# Patient Record
Sex: MALE | Race: WHITE
[De-identification: names, ages, dates, MRNs, and addresses within clinical notes are randomized per-mention and may not be internally consistent; named-entity substitution may affect disease eponyms.]

---

## 2019-08-12 ENCOUNTER — HOSPITAL ENCOUNTER (EMERGENCY)
Dept: HOSPITAL 56 - MW.ED | Age: 24
Discharge: HOME | End: 2019-08-12
Payer: COMMERCIAL

## 2019-08-12 VITALS — DIASTOLIC BLOOD PRESSURE: 95 MMHG | SYSTOLIC BLOOD PRESSURE: 146 MMHG | HEART RATE: 92 BPM

## 2019-08-12 DIAGNOSIS — Z23: ICD-10-CM

## 2019-08-12 DIAGNOSIS — I10: ICD-10-CM

## 2019-08-12 DIAGNOSIS — S16.1XXA: Primary | ICD-10-CM
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
## 2019-08-12 DIAGNOSIS — S00.01XA: ICD-10-CM

## 2019-08-12 PROCEDURE — 72125 CT NECK SPINE W/O DYE: CPT

## 2019-08-12 PROCEDURE — 99283 EMERGENCY DEPT VISIT LOW MDM: CPT

## 2019-08-12 PROCEDURE — 70450 CT HEAD/BRAIN W/O DYE: CPT

## 2019-08-12 PROCEDURE — 90715 TDAP VACCINE 7 YRS/> IM: CPT

## 2019-08-12 PROCEDURE — 90471 IMMUNIZATION ADMIN: CPT

## 2019-08-12 NOTE — CT
INDICATION: Cervical spine injury from trauma, beam fall on head



TECHNIQUE: CT cervical spine without i.v. contrast. Coronal and sagittal 

reformats were obtained.



COMPARISON: None



FINDINGS: 



Alignment: Unremarkable. 



Bone: No acute fractures or aggressive bone lesions are identified. 



Disc: The disc spaces are unremarkable in appearance. The facet joints are 

unremarkable. 



Soft tissue: Moderate to severe nodular soft tissue thickening is noted in 

the posterior nasopharynx is seen. The visualized lung apices and 

mediastinum are unremarkable. Bilateral jugular adenopathy is present with 

lymph nodes measuring up to 1.2 cm. 



IMPRESSIONS: 



1. No acute osseous injuries are identified. 



2. Moderate to severe nodular soft tissue thickening is noted in the 

posterior nasopharynx is seen. In the presence of jugular adenopathy, 

clinical follow-up is recommended to exclude lymphoproliferative disorders.



Dictated by Raúl Flores MD @ 8/12/2019 4:11:00 AM



Please note that all CT scans at this facility use dose modulation, 

iterative reconstruction, and/or weight-based dosing when appropriate to 

reduce radiation dose to as low as reasonably achievable.



Dictated by: Raúl Flores MD @ 08/12/2019 04:11:03



(Electronically Signed)

## 2019-08-12 NOTE — CT
INDICATION: Trauma, beam fall on head



TECHNIQUE: CT Head without i.v. contrast.



COMPARISON: None



FINDINGS: 



CSF space: The ventricles are normal for age. 



Brain: No evidence of mass, acute infarction or hemorrhage is seen. No 

mass-effect or midline shift is seen. The brain parenchyma is otherwise 

normal in appearance with preservation of the gray-white matter junction. 



Calvarium: The visualized paranasal sinuses are well aerated. The mastoid 

air cells are clear. The visualized orbits are grossly unremarkable. The 

calvarium is unremarkable in appearance with no fractures identified. 

Moderate-to-severe soft tissue swelling is seen in the posterior 

nasopharynx. 



IMPRESSIONS: 



1. No evidence of acute infarction, intracranial hemorrhage, or mass-effect 

seen. 



2. Moderate-to-severe soft tissue swelling is seen in the posterior 

nasopharynx. This may be due to adenoidal hypertrophy but correlation with 

physical exam is recommended to exclude any generalized adenopathy 

suggestive of lymphoproliferative disorders or lymphoma.



Dictated by Raúl Flores MD @ 8/12/2019 4:06:46 AM



Please note that all CT scans at this facility use dose modulation, 

iterative reconstruction, and/or weight-based dosing when appropriate to 

reduce radiation dose to as low as reasonably achievable.



Dictated by: Raúl Flores MD @ 08/12/2019 04:06:50



(Electronically Signed)

## 2019-08-12 NOTE — EDM.PDOC
ED HPI GENERAL MEDICAL PROBLEM





- General


Chief Complaint: Laceration


Stated Complaint: CUT ON HEAD


Time Seen by Provider: 08/12/19 03:06





- History of Present Illness


INITIAL COMMENTS - FREE TEXT/NARRATIVE: 





HISTORY AND PHYSICAL:





History of present illness:


The patient is a healthy 23-year-old male who presents with complaints of pain 

to the top of his head and his neck after a piece of wood fell from the balcony 

above his apartment onto the top of his head. Patient showed me a picture of 

his apartment and he was leaning out of a sliding glass door to see where his 

dog was and a loose piece of wood from the balcony above him fell on top of his 

head. He did not pass out or blacked out but he has pain and swelling with a 

superficial wound on the top of his head and is complaining of diffuse neck 

pain. He has no weakness numbness or tingling in his extremities no chest pain 

or shortness of breath and has had no nausea or vomiting. He has no visual 

changes and he is currently under the care of one of our ophthalmologist, Dr. Perdomo, for ongoing issues with his eyes but there is nothing new or different 

this evening. He does not feel confused and he did not take any medications 

prior to coming here. He is unsure of his last tetanus shot. The patient is not 

on any anticoagulation therapy





Review of systems: 


As per history of present illness and below otherwise all systems reviewed and 

negative.





Past medical history: 


As per history of present illness and as reviewed below otherwise 

noncontributory.





Surgical history: 


As per history of present illness and as reviewed below otherwise 

noncontributory.





Social history: 


No reported history of drug or alcohol abuse.





Family history: 


As per history of present illness and as reviewed below otherwise 

noncontributory.





Physical exam:


General: Well-developed well-nourished man who is nontoxic and vital signs are 

noted by me


HEENT: normocephalic, at the top of the patient's scalp/skull there is an ill-

defined area of soft tissue swelling and a superficial abrasion seen but no 

laceration, there is tenderness in this area but no bony defect, pupils reactive

, negative for conjunctival pallor or scleral icterus, mucous membranes moist, 

throat clear, neck supple, nontender, trachea midline. There are no midline step

-offs in his defects of the cervical spine but there is diffuse paraspinal 

tenderness and a c-collar was placed.


Lungs: Clear to auscultation, breath sounds equal bilaterally, chest nontender.


Heart: S1S2, regular rate and rhythm no overt murmurs


Abdomen: Soft, nondistended, nontender. NABS


Pelvis: Deferred


Genitourinary: Deferred.


Rectal: Deferred.


Extremities: Atraumatic, full range of motion and no defects or deficits, no 

edema Neurovascular unremarkable.


Neuro: Awake, alert, oriented. Cranial nerves II through XII unremarkable. 

Cerebellum unremarkable. Motor and sensory unremarkable throughout. Exam 

nonfocal.


Back: There are no midline step-offs tenderness defects the thoracic or lumbar 

spine and no posterior rib tenderness


Diagnostics:


CT scan of the head and C-spine





Therapeutics:


Cleansing of the superficial abrasion on the scalp and bacitracin, Tdap, Tylenol





I did discuss with the patient his CT scan findings as he is currently being 

worked up by ophthalmology for another problem and I was not sure if this would 

be helpful to them to review the CTs. He notes that he has a large adenoids and 

a lot of lymphomatous type tissue edema that would correlate with a 

lymphoproliferative disorder and it is not related to 2 days workup but he is 

aware and will have the ophthalmologist review the CT with respect to his 

current workup for his eye issues.





Impression: 


Closed head injury with scalp abrasion and cervical strain





Definitive disposition and diagnosis as appropriate pending reevaluation and 

review of above.


  ** head area


Pain Score (Numeric/FACES): 5





- Related Data


 Allergies











Allergy/AdvReac Type Severity Reaction Status Date / Time


 


No Known Allergies Allergy   Verified 08/12/19 03:06














Past Medical History


HEENT History: Reports: Impaired Vision


Other HEENT History: Glasses


Cardiovascular History: Reports: Hypertension


Respiratory History: Reports: Asthma





- Past Surgical History


Dermatological Surgical History: Reports: Other (See Below)





Social & Family History





- Family History


Family Medical History: Noncontributory





- Living Situation & Occupation


Living situation: Reports: Single, with Significant Other


Occupation: Employed





ED ROS GENERAL





- Review of Systems


Review Of Systems: ROS reveals no pertinent complaints other than HPI.





ED EXAM, SKIN/RASH


Exam: See Below (See dictation)





Course





- Vital Signs


Last Recorded V/S: 


 Last Vital Signs











Temp  36.6 C   08/12/19 03:07


 


Pulse  102 H  08/12/19 03:07


 


Resp  18   08/12/19 03:07


 


BP  166/95 H  08/12/19 03:07


 


Pulse Ox  98   08/12/19 03:07














- Orders/Labs/Meds


Orders: 


 Active Orders 24 hr











 Category Date Time Status


 


 Communication Order [RC] STAT Care  08/12/19 03:13 Active


 


 Vaccines to be Administered [RC] PER UNIT ROUTINE Care  08/12/19 03:13 Active











Meds: 


Medications














Discontinued Medications














Generic Name Dose Route Start Last Admin





  Trade Name Tano  PRN Reason Stop Dose Admin


 


Acetaminophen  1,000 mg  08/12/19 03:13  08/12/19 03:21





  Tylenol Extra Strength  PO  08/12/19 03:14  1,000 mg





  ONETIME ONE   Administration





     





     





     





     


 


Bacitracin  1 dose  08/12/19 03:13  08/12/19 03:24





  Bacitracin Oint 1 Gm  TOP  08/12/19 03:14  1 dose





  ONETIME ONE   Administration





     





     





     





     


 


Diphtheria/Tetanus/Acell Pertussis  0.5 ml  08/12/19 03:13  08/12/19 03:28





  Adacel  IM  08/12/19 03:14  0.5 ml





  .ONCE ONE   Administration





     





     





     





     














Departure





- Departure


Time of Disposition: 04:17


Disposition: Home, Self-Care 01


Condition: Good


Clinical Impression: 


Closed head injury


Qualifiers:


 Encounter type: initial encounter Qualified Code(s): S09.90XA - Unspecified 

injury of head, initial encounter





Scalp abrasion


Qualifiers:


 Encounter type: initial encounter Qualified Code(s): S00.01XA - Abrasion of 

scalp, initial encounter





Cervical strain


Qualifiers:


 Encounter type: initial encounter Qualified Code(s): S16.1XXA - Strain of 

muscle, fascia and tendon at neck level, initial encounter








- Discharge Information


Referrals: 


PCP,None [Primary Care Provider] - 


Forms:  ED Department Discharge


Additional Instructions: 


The following information is given to patients seen in the emergency department 

who are being discharged to home. This information is to outline your options 

for follow-up care. We provide all patients seen in our emergency department 

with a follow-up referral.





The need for follow-up, as well as the timing and circumstances, are variable 

depending upon the specifics of your emergency department visit.





If you don't have a primary care physician on staff, we will provide you with a 

referral. We always advise you to contact your personal physician following an 

emergency department visit to inform them of the circumstance of the visit and 

for follow-up with them and/or the need for any referrals to a consulting 

specialist.





The emergency department will also refer you to a specialist when appropriate. 

This referral assures that you have the opportunity for followup care with a 

specialist. All of these measure are taken in an effort to provide you with 

optimal care, which includes your followup.





Under all circumstances we always encourage you to contact your private 

physician who remains a resource for coordinating  your care. When calling for 

followup care, please make the office aware that this follow-up is from your 

recent emergency room visit. If for any reason you are refused follow-up, 

please contact the [facility] emergency 

department at [phone] and ask to speak to the emergency department 

charge nurse.





[facility] 


Primary care- Internal Medicine and Family [facility] [address]


[phone]





Use over-the-counter medications for headache and neck pain and apply ice to 

all areas of swelling and pain for the next 24 hours. You can then switch to 

heat on the muscles of your neck as there may be spasm. Keep your abrasion on 

her scalp clean and dry with mild soap and water and you may apply bacitracin 

or Neosporin as you choose. Please connect with your primary care provider in 

the clinic or one of ours for further care and evaluation and please also follow

-up with Dr. Perdomo and make sure that he is aware that you did have a CT scan 

of your head and neck and he can review those and see if any the incidental 

findings relate to your workup with him. Return to ER as needed and as discussed








- My Orders


Last 24 Hours: 


My Active Orders





08/12/19 03:13


Communication Order [RC] STAT 


Vaccines to be Administered [RC] PER UNIT ROUTINE 














- Assessment/Plan


Last 24 Hours: 


My Active Orders





08/12/19 03:13


Communication Order [RC] STAT 


Vaccines to be Administered [RC] PER UNIT ROUTINE